# Patient Record
Sex: MALE | Race: BLACK OR AFRICAN AMERICAN | NOT HISPANIC OR LATINO | URBAN - METROPOLITAN AREA
[De-identification: names, ages, dates, MRNs, and addresses within clinical notes are randomized per-mention and may not be internally consistent; named-entity substitution may affect disease eponyms.]

---

## 2023-05-08 ENCOUNTER — OFFICE (OUTPATIENT)
Dept: URBAN - METROPOLITAN AREA CLINIC 102 | Facility: CLINIC | Age: 68
End: 2023-05-08
Payer: MEDICARE

## 2023-05-08 VITALS
SYSTOLIC BLOOD PRESSURE: 143 MMHG | WEIGHT: 139 LBS | HEIGHT: 67 IN | DIASTOLIC BLOOD PRESSURE: 78 MMHG | TEMPERATURE: 98.6 F | HEART RATE: 58 BPM

## 2023-05-08 DIAGNOSIS — K62.5 HEMORRHAGE OF ANUS AND RECTUM: ICD-10-CM

## 2023-05-08 DIAGNOSIS — Z86.010 PERSONAL HISTORY OF COLONIC POLYPS: ICD-10-CM

## 2023-05-08 PROCEDURE — 99204 OFFICE O/P NEW MOD 45 MIN: CPT | Performed by: PHYSICIAN ASSISTANT

## 2023-05-08 NOTE — SERVICEHPINOTES
Pt is a 68 yr old male here to discuss infrequent blood in the stool/hemorrhoids. He had prostate cancer  20 yrs ago with seed implants. He has hematospermia and is undergoing a work up for this. He sees hematuria sometimes too. Currently on an abx incase he has prostatitis causing symptoms. No rectal pain but sees blood in the stool intermittently over the past few weeks--dark red. Can see blood upon wiping too--small amounts. Stools are a type 4 on the BSS, and BMs feel complete, may have straining at times. No abdominal pain or unexplained weight loss.  
evelin waters
His last colonoscopy was in 2016--polyps removed--he has a hx of colon polyps prior to this. No family hx of CRC and unsure about a family hx of polyps. 
evelin waters
Has seen cardiology for HTN. Normal echo and stress tests. No chest pain or SOB. 
evelin waters
No other GI related complaints today.

## 2023-05-18 ENCOUNTER — ON CAMPUS - OUTPATIENT (OUTPATIENT)
Dept: URBAN - METROPOLITAN AREA HOSPITAL 16 | Facility: HOSPITAL | Age: 68
End: 2023-05-18
Payer: MEDICARE

## 2023-05-18 DIAGNOSIS — D12.8 BENIGN NEOPLASM OF RECTUM: ICD-10-CM

## 2023-05-18 DIAGNOSIS — Z86.010 PERSONAL HISTORY OF COLONIC POLYPS: ICD-10-CM

## 2023-05-18 PROCEDURE — 45385 COLONOSCOPY W/LESION REMOVAL: CPT | Performed by: INTERNAL MEDICINE
